# Patient Record
Sex: FEMALE | Race: WHITE | NOT HISPANIC OR LATINO | Employment: FULL TIME | URBAN - NONMETROPOLITAN AREA
[De-identification: names, ages, dates, MRNs, and addresses within clinical notes are randomized per-mention and may not be internally consistent; named-entity substitution may affect disease eponyms.]

---

## 2017-10-31 ENCOUNTER — NON-PROVIDER VISIT (OUTPATIENT)
Dept: URGENT CARE | Facility: PHYSICIAN GROUP | Age: 22
End: 2017-10-31

## 2017-10-31 DIAGNOSIS — Z02.1 DRUG SCREENING, PRE-EMPLOYMENT: ICD-10-CM

## 2017-10-31 PROCEDURE — 8907 PR URINE COLLECT ONLY: Performed by: PHYSICIAN ASSISTANT

## 2018-07-23 ENCOUNTER — OFFICE VISIT (OUTPATIENT)
Dept: URGENT CARE | Facility: PHYSICIAN GROUP | Age: 23
End: 2018-07-23
Payer: COMMERCIAL

## 2018-07-23 VITALS
RESPIRATION RATE: 16 BRPM | SYSTOLIC BLOOD PRESSURE: 110 MMHG | DIASTOLIC BLOOD PRESSURE: 86 MMHG | HEART RATE: 104 BPM | WEIGHT: 243 LBS | BODY MASS INDEX: 34.87 KG/M2 | TEMPERATURE: 98.1 F | OXYGEN SATURATION: 99 %

## 2018-07-23 DIAGNOSIS — J20.9 BRONCHOSPASM WITH BRONCHITIS, ACUTE: ICD-10-CM

## 2018-07-23 DIAGNOSIS — B96.89 ACUTE BACTERIAL RHINOSINUSITIS: ICD-10-CM

## 2018-07-23 DIAGNOSIS — J01.90 ACUTE BACTERIAL RHINOSINUSITIS: ICD-10-CM

## 2018-07-23 PROCEDURE — 99203 OFFICE O/P NEW LOW 30 MIN: CPT | Performed by: EMERGENCY MEDICINE

## 2018-07-23 RX ORDER — AMOXICILLIN AND CLAVULANATE POTASSIUM 875; 125 MG/1; MG/1
1 TABLET, FILM COATED ORAL 2 TIMES DAILY
Qty: 14 TAB | Refills: 0 | Status: SHIPPED | OUTPATIENT
Start: 2018-07-23 | End: 2022-02-09

## 2018-07-23 ASSESSMENT — ENCOUNTER SYMPTOMS
SINUS PRESSURE: 1
WHEEZING: 1
COUGH: 1
RHINORRHEA: 1
SPUTUM PRODUCTION: 0
HOARSE VOICE: 0
SHORTNESS OF BREATH: 0
VOMITING: 0
HEARTBURN: 0
DIARRHEA: 0
NAUSEA: 0
SORE THROAT: 1
FEVER: 0
HEMOPTYSIS: 0
SINUS PAIN: 1

## 2018-07-24 NOTE — PROGRESS NOTES
Subjective:      Vanda Villanueva is a 23 y.o. female who presents with Cough (x1wk) and Sore Throat (x4d)            Cough   This is a new problem. Episode onset: 2 weeks. The problem has been unchanged. The problem occurs every few minutes. The cough is non-productive. Associated symptoms include nasal congestion, postnasal drip, rhinorrhea, a sore throat and wheezing. Pertinent negatives include no chest pain, ear congestion, ear pain, fever, heartburn, hemoptysis, rash or shortness of breath. The symptoms are aggravated by lying down. She has tried OTC cough suppressant for the symptoms. The treatment provided mild relief. Her past medical history is significant for environmental allergies.   Sinusitis   This is a new problem. Episode onset: 2 weeks. The problem is unchanged. There has been no fever. The pain is mild. Associated symptoms include congestion, coughing, sinus pressure and a sore throat. Pertinent negatives include no ear pain, hoarse voice, shortness of breath or sneezing. Past treatments include oral decongestants. The treatment provided mild relief.   PMH RAD.    Review of Systems   Constitutional: Negative for fever.   HENT: Positive for congestion, postnasal drip, rhinorrhea, sinus pain, sinus pressure and sore throat. Negative for ear pain, hearing loss, hoarse voice, nosebleeds and sneezing.         Purulent nasal discharge noted.   Respiratory: Positive for cough and wheezing. Negative for hemoptysis, sputum production and shortness of breath.    Cardiovascular: Negative for chest pain.   Gastrointestinal: Negative for diarrhea, heartburn, nausea and vomiting.   Skin: Negative for rash.   Endo/Heme/Allergies: Positive for environmental allergies.     PMH:  has a past medical history of No active medical problems (5/18/2015).  MEDS:   Current Outpatient Prescriptions:   •  Albuterol Sulfate 108 (90 Base) MCG/ACT AEROSOL POWDER, BREATH ACTIVATED, Inhale 1-2 Puffs by mouth every 6 hours as needed  (coughing, wheezing)., Disp: 1 Each, Rfl: 0  •  amoxicillin-clavulanate (AUGMENTIN) 875-125 MG Tab, Take 1 Tab by mouth 2 times a day., Disp: 14 Tab, Rfl: 0  •  ciprofloxacin (CIPRO) 500 MG TABS, Take 1 Tab by mouth 2 times a day., Disp: 20 Tab, Rfl: 0  •  ondansetron (ZOFRAN ODT) 4 MG TBDP, Take 1 Tab by mouth every 8 hours as needed for Nausea/Vomiting., Disp: 10 Tab, Rfl: 0  ALLERGIES:   Allergies   Allergen Reactions   • Morphine      Ineffective for treatment     SURGHX:   Past Surgical History:   Procedure Laterality Date   • TIBIA ORIF      lt     SOCHX:  reports that she has never smoked. She has never used smokeless tobacco. She reports that she does not drink alcohol or use drugs.  FH: family history is not on file.       Objective:     /86   Pulse (!) 104   Temp 36.7 °C (98.1 °F)   Resp 16   Wt 110.2 kg (243 lb)   SpO2 99%   BMI 34.87 kg/m²      Physical Exam   Constitutional: She is oriented to person, place, and time. She appears well-developed and well-nourished. She is cooperative. She does not appear ill. No distress.   HENT:   Right Ear: Tympanic membrane and ear canal normal.   Left Ear: Tympanic membrane and ear canal normal.   Nose: Mucosal edema present. No rhinorrhea. Right sinus exhibits no maxillary sinus tenderness and no frontal sinus tenderness. Left sinus exhibits maxillary sinus tenderness and frontal sinus tenderness.   Mouth/Throat: Uvula is midline. No trismus in the jaw. No uvula swelling. No oropharyngeal exudate, posterior oropharyngeal edema or posterior oropharyngeal erythema.   Eyes: Conjunctivae are normal.   Neck: Trachea normal. Neck supple.   Cardiovascular: Normal rate, regular rhythm and normal heart sounds.    Pulmonary/Chest: Effort normal. She has no decreased breath sounds. She has no wheezes. She has no rhonchi. She has no rales.   Lymphadenopathy:     She has no cervical adenopathy.   Neurological: She is alert and oriented to person, place, and time.    Skin: Skin is warm and dry.   Psychiatric: She has a normal mood and affect.               Assessment/Plan:     1. Acute bacterial rhinosinusitis  Recommended supportive care measures, including rest, increasing oral fluid intake and use of over-the-counter medications for relief of symptoms.  Based on duration:  - amoxicillin-clavulanate (AUGMENTIN) 875-125 MG Tab; Take 1 Tab by mouth 2 times a day.  Dispense: 14 Tab; Refill: 0    2. Bronchospasm with bronchitis, acute  - Albuterol Sulfate 108 (90 Base) MCG/ACT AEROSOL POWDER, BREATH ACTIVATED; Inhale 1-2 Puffs by mouth every 6 hours as needed (coughing, wheezing).  Dispense: 1 Each; Refill: 0

## 2018-07-24 NOTE — PATIENT INSTRUCTIONS
Use over-the-counter oxymetazoline (Afrin) nasal spray as needed for up to 3 days only; follow package instructions for dosing.  Use over-the-counter acetaminophen (Tylenol) as needed for pain relief; follow the package instructions for dosing.  Consider use of over-the-counter dextromethorphan (Benylin DM) 10-30 mg every 4-8 hours as needed for cough relief.  Use an oral probiotic daily, such as Culturelle, Align, or yogurt to reduce gastrointestinal symptoms.  Bronchospasm, Adult  A bronchospasm is when the tubes that carry air in and out of your lungs (airways) spasm or tighten. During a bronchospasm it is hard to breathe. This is because the airways get smaller. A bronchospasm can be triggered by:  · Allergies. These may be to animals, pollen, food, or mold.  · Infection. This is a common cause of bronchospasm.  · Exercise.  · Irritants. These include pollution, cigarette smoke, strong odors, aerosol sprays, and paint fumes.  · Weather changes.  · Stress.  · Being emotional.  Follow these instructions at home:  · Always have a plan for getting help. Know when to call your doctor and local emergency services (911 in the [place] Know where you can get emergency care.  · Only take medicines as told by your doctor.  · If you were prescribed an inhaler or nebulizer machine, ask your doctor how to use it correctly. Always use a spacer with your inhaler if you were given one.  · Stay calm during an attack. Try to relax and breathe more slowly.  · Control your home environment:  ¨ Change your heating and air conditioning filter at least once a month.  ¨ Limit your use of fireplaces and wood stoves.  ¨ Do not  smoke. Do not  allow smoking in your home.  ¨ Avoid perfumes and fragrances.  ¨ Get rid of pests (such as roaches and mice) and their droppings.  ¨ Throw away plants if you see mold on them.  ¨ Keep your house clean and dust free.  ¨ Replace carpet with wood, tile, or vinyl april. Carpet can trap dander and  dust.  ¨ Use allergy-proof pillows, mattress covers, and box spring covers.  ¨ Wash bed sheets and blankets every week in hot water. Dry them in a dryer.  ¨ Use blankets that are made of polyester or cotton.  ¨ Wash hands frequently.  Contact a doctor if:  · You have muscle aches.  · You have chest pain.  · The thick spit you spit or cough up (sputum) changes from clear or white to yellow, green, gray, or bloody.  · The thick spit you spit or cough up gets thicker.  · There are problems that may be related to the medicine you are given such as:  ¨ A rash.  ¨ Itching.  ¨ Swelling.  ¨ Trouble breathing.  Get help right away if:  · You feel you cannot breathe or catch your breath.  · You cannot stop coughing.  · Your treatment is not helping you breathe better.  · You have very bad chest pain.  This information is not intended to replace advice given to you by your health care provider. Make sure you discuss any questions you have with your health care provider.  Document Released: 10/15/2010 Document Revised: 05/25/2017 Document Reviewed: 06/10/2014  [employer] Interactive Patient Education © 2017 [employer]    Sinusitis, Adult  Sinusitis is soreness and inflammation of your sinuses. Sinuses are hollow spaces in the bones around your face. Your sinuses are located:  · Around your eyes.  · In the middle of your forehead.  · Behind your nose.  · In your cheekbones.  Your sinuses and nasal passages are lined with a stringy fluid (mucus). Mucus normally drains out of your sinuses. When your nasal tissues become inflamed or swollen, the mucus can become trapped or blocked so air cannot flow through your sinuses. This allows bacteria, viruses, and funguses to grow, which leads to infection.  Sinusitis can develop quickly and last for 7?10 days (acute) or for more than 12 weeks (chronic). Sinusitis often develops after a cold.  What are the causes?  This condition is caused by anything that creates swelling in the sinuses  or stops mucus from draining, including:  · Allergies.  · Asthma.  · Bacterial or viral infection.  · Abnormally shaped bones between the nasal passages.  · Nasal growths that contain mucus (nasal polyps).  · Narrow sinus openings.  · Pollutants, such as chemicals or irritants in the air.  · A foreign object stuck in the nose.  · A fungal infection. This is rare.  What increases the risk?  The following factors may make you more likely to develop this condition:  · Having allergies or asthma.  · Having had a recent cold or respiratory tract infection.  · Having structural deformities or blockages in your nose or sinuses.  · Having a weak immune system.  · Doing a lot of swimming or diving.  · Overusing nasal sprays.  · Smoking.  What are the signs or symptoms?  The main symptoms of this condition are pain and a feeling of pressure around the affected sinuses. Other symptoms include:  · Upper toothache.  · Earache.  · Headache.  · Bad breath.  · Decreased sense of smell and taste.  · A cough that may get worse at night.  · Fatigue.  · Fever.  · Thick drainage from your nose. The drainage is often green and it may contain pus (purulent).  · Stuffy nose or congestion.  · Postnasal drip. This is when extra mucus collects in the throat or back of the nose.  · Swelling and warmth over the affected sinuses.  · Sore throat.  · Sensitivity to light.  How is this diagnosed?  This condition is diagnosed based on symptoms, a medical history, and a physical exam. To find out if your condition is acute or chronic, your health care provider may:  · Look in your nose for signs of nasal polyps.  · Tap over the affected sinus to check for signs of infection.  · View the inside of your sinuses using an imaging device that has a light attached (endoscope).  If your health care provider suspects that you have chronic sinusitis, you may also:  · Be tested for allergies.  · Have a sample of mucus taken from your nose (nasal culture) and  checked for bacteria.  · Have a mucus sample examined to see if your sinusitis is related to an allergy.  If your sinusitis does not respond to treatment and it lasts longer than 8 weeks, you may have an MRI or CT scan to check your sinuses. These scans also help to determine how severe your infection is.  In rare cases, a bone biopsy may be done to rule out more serious types of fungal sinus disease.  How is this treated?  Treatment for sinusitis depends on the cause and whether your condition is chronic or acute. If a virus is causing your sinusitis, your symptoms will go away on their own within 10 days. You may be given medicines to relieve your symptoms, including:  · Topical nasal decongestants. They shrink swollen nasal passages and let mucus drain from your sinuses.  · Antihistamines. These drugs block inflammation that is triggered by allergies. This can help to ease swelling in your nose and sinuses.  · Topical nasal corticosteroids. These are nasal sprays that ease inflammation and swelling in your nose and sinuses.  · Nasal saline washes. These rinses can help to get rid of thick mucus in your nose.  If your condition is caused by bacteria, you will be given an antibiotic medicine. If your condition is caused by a fungus, you will be given an antifungal medicine.  Surgery may be needed to correct underlying conditions, such as narrow nasal passages. Surgery may also be needed to remove polyps.  Follow these instructions at home:  Medicines  · Take, use, or apply over-the-counter and prescription medicines only as told by your health care provider. These may include nasal sprays.  · If you were prescribed an antibiotic medicine, take it as told by your health care provider. Do not stop taking the antibiotic even if you start to feel better.  Hydrate and Humidify  · Drink enough water to keep your urine clear or pale yellow. Staying hydrated will help to thin your mucus.  · Use a cool mist humidifier to  keep the humidity level in your home above 50%.  · Inhale steam for 10-15 minutes, 3-4 times a day or as told by your health care provider. You can do this in the bathroom while a hot shower is running.  · Limit your exposure to cool or dry air.  Rest  · Rest as much as possible.  · Sleep with your head raised (elevated).  · Make sure to get enough sleep each night.  General instructions  · Apply a warm, moist washcloth to your face 3-4 times a day or as told by your health care provider. This will help with discomfort.  · Wash your hands often with soap and water to reduce your exposure to viruses and other germs. If soap and water are not available, use hand .  · Do not smoke. Avoid being around people who are smoking (secondhand smoke).  · Keep all follow-up visits as told by your health care provider. This is important.  Contact a health care provider if:  · You have a fever.  · Your symptoms get worse.  · Your symptoms do not improve within 10 days.  Get help right away if:  · You have a severe headache.  · You have persistent vomiting.  · You have pain or swelling around your face or eyes.  · You have vision problems.  · You develop confusion.  · Your neck is stiff.  · You have trouble breathing.  This information is not intended to replace advice given to you by your health care provider. Make sure you discuss any questions you have with your health care provider.  Document Released: 12/18/2006 Document Revised: 08/13/2017 Document Reviewed: 10/12/2016  [employer] Interactive Patient Education © 2017 [employer]

## 2021-05-21 ENCOUNTER — HOSPITAL ENCOUNTER (OUTPATIENT)
Dept: LAB | Facility: MEDICAL CENTER | Age: 26
End: 2021-05-21
Attending: OBSTETRICS & GYNECOLOGY
Payer: COMMERCIAL

## 2021-05-21 LAB
PROLACTIN SERPL-MCNC: 12 NG/ML (ref 2.8–26)
TSH SERPL DL<=0.005 MIU/L-ACNC: 1.25 UIU/ML (ref 0.38–5.33)

## 2021-05-21 PROCEDURE — 84443 ASSAY THYROID STIM HORMONE: CPT

## 2021-05-21 PROCEDURE — 82951 GLUCOSE TOLERANCE TEST (GTT): CPT

## 2021-05-21 PROCEDURE — 82952 GTT-ADDED SAMPLES: CPT

## 2021-05-21 PROCEDURE — 83520 IMMUNOASSAY QUANT NOS NONAB: CPT

## 2021-05-21 PROCEDURE — 83525 ASSAY OF INSULIN: CPT | Mod: 91

## 2021-05-21 PROCEDURE — 84146 ASSAY OF PROLACTIN: CPT

## 2021-05-21 PROCEDURE — 36415 COLL VENOUS BLD VENIPUNCTURE: CPT

## 2021-05-24 LAB
GLUCOSE 1H P CHAL SERPL-MCNC: 120 MG/DL (ref 65–199)
GLUCOSE 2H P CHAL SERPL-MCNC: 98 MG/DL (ref 65–139)
GLUCOSE BS SERPL-MCNC: 93 MG/DL (ref 65–99)
INSULIN 1H P CHAL SERPL-ACNC: 147 UIU/ML (ref 29–88)
INSULIN 2H P CHAL SERPL-ACNC: 98 UIU/ML (ref 22–79)
INSULIN P FAST SERPL-ACNC: 19 UIU/ML (ref 3–19)

## 2021-05-25 LAB — MIS SERPL-MCNC: 5.82 NG/ML (ref 0.4–16.02)

## 2022-02-09 ENCOUNTER — HOSPITAL ENCOUNTER (OUTPATIENT)
Facility: MEDICAL CENTER | Age: 27
End: 2022-02-09
Attending: NURSE PRACTITIONER
Payer: COMMERCIAL

## 2022-02-09 ENCOUNTER — OFFICE VISIT (OUTPATIENT)
Dept: URGENT CARE | Facility: PHYSICIAN GROUP | Age: 27
End: 2022-02-09
Payer: COMMERCIAL

## 2022-02-09 VITALS
DIASTOLIC BLOOD PRESSURE: 70 MMHG | HEIGHT: 70 IN | OXYGEN SATURATION: 97 % | WEIGHT: 250 LBS | RESPIRATION RATE: 20 BRPM | SYSTOLIC BLOOD PRESSURE: 124 MMHG | HEART RATE: 87 BPM | BODY MASS INDEX: 35.79 KG/M2 | TEMPERATURE: 98 F

## 2022-02-09 DIAGNOSIS — J01.00 ACUTE NON-RECURRENT MAXILLARY SINUSITIS: ICD-10-CM

## 2022-02-09 DIAGNOSIS — U07.1 COVID-19: ICD-10-CM

## 2022-02-09 PROCEDURE — U0003 INFECTIOUS AGENT DETECTION BY NUCLEIC ACID (DNA OR RNA); SEVERE ACUTE RESPIRATORY SYNDROME CORONAVIRUS 2 (SARS-COV-2) (CORONAVIRUS DISEASE [COVID-19]), AMPLIFIED PROBE TECHNIQUE, MAKING USE OF HIGH THROUGHPUT TECHNOLOGIES AS DESCRIBED BY CMS-2020-01-R: HCPCS

## 2022-02-09 PROCEDURE — 99203 OFFICE O/P NEW LOW 30 MIN: CPT | Mod: CS | Performed by: NURSE PRACTITIONER

## 2022-02-09 PROCEDURE — U0005 INFEC AGEN DETEC AMPLI PROBE: HCPCS

## 2022-02-09 RX ORDER — AMOXICILLIN 875 MG/1
875 TABLET, COATED ORAL 2 TIMES DAILY
Qty: 14 TABLET | Refills: 0 | Status: SHIPPED | OUTPATIENT
Start: 2022-02-09 | End: 2022-02-16

## 2022-02-09 RX ORDER — ESCITALOPRAM OXALATE 10 MG/1
10 TABLET ORAL DAILY
COMMUNITY
End: 2023-09-02

## 2022-02-09 ASSESSMENT — ENCOUNTER SYMPTOMS
DIARRHEA: 0
SINUS PRESSURE: 1
FEVER: 0
SORE THROAT: 1
SHORTNESS OF BREATH: 0
NAUSEA: 1
WHEEZING: 0
VOMITING: 0
SINUS PAIN: 1
COUGH: 0

## 2022-02-09 NOTE — PROGRESS NOTES
Subjective:     Vanda Parmar is a 26 y.o. female who presents for Other (sinus congestion x 1 week )      Started last Wednesday. Post nasal drip.  Ear and dental pain last week. Had 2 negative home COVID tests. Possible COVID in Feb 2020. Vaccinated. Sudafed, humidifier, Vicks. Is 11 weeks pregnant. Denies vaginal bleeding or abnormal abdominal pain. Has had intermittent cramping throughout pregnancy, OB/GYn advised her that is normal.     Sinus Problem  This is a new problem. The current episode started 1 to 4 weeks ago. The problem has been gradually worsening since onset. There has been no fever. Associated symptoms include congestion, ear pain, sinus pressure and a sore throat. Pertinent negatives include no coughing or shortness of breath. The treatment provided mild relief.       Past Medical History:   Diagnosis Date   • No active medical problems 5/18/2015       Past Surgical History:   Procedure Laterality Date   • ORIF, FRACTURE, TIBIA      lt       Social History     Socioeconomic History   • Marital status:      Spouse name: Not on file   • Number of children: Not on file   • Years of education: Not on file   • Highest education level: Not on file   Occupational History   • Not on file   Tobacco Use   • Smoking status: Never Smoker   • Smokeless tobacco: Never Used   Substance and Sexual Activity   • Alcohol use: No   • Drug use: No   • Sexual activity: Never   Other Topics Concern   • Not on file   Social History Narrative   • Not on file     Social Determinants of Health     Financial Resource Strain:    • Difficulty of Paying Living Expenses: Not on file   Food Insecurity:    • Worried About Running Out of Food in the Last Year: Not on file   • Ran Out of Food in the Last Year: Not on file   Transportation Needs:    • Lack of Transportation (Medical): Not on file   • Lack of Transportation (Non-Medical): Not on file   Physical Activity:    • Days of Exercise per Week: Not on file   •  "Minutes of Exercise per Session: Not on file   Stress:    • Feeling of Stress : Not on file   Social Connections:    • Frequency of Communication with Friends and Family: Not on file   • Frequency of Social Gatherings with Friends and Family: Not on file   • Attends Alevism Services: Not on file   • Active Member of Clubs or Organizations: Not on file   • Attends Club or Organization Meetings: Not on file   • Marital Status: Not on file   Intimate Partner Violence:    • Fear of Current or Ex-Partner: Not on file   • Emotionally Abused: Not on file   • Physically Abused: Not on file   • Sexually Abused: Not on file   Housing Stability:    • Unable to Pay for Housing in the Last Year: Not on file   • Number of Places Lived in the Last Year: Not on file   • Unstable Housing in the Last Year: Not on file        No family history on file.     Allergies   Allergen Reactions   • Morphine      Ineffective for treatment       Review of Systems   Constitutional: Positive for malaise/fatigue. Negative for fever.   HENT: Positive for congestion, ear pain, sinus pressure, sinus pain and sore throat. Negative for ear discharge.    Respiratory: Negative for cough, shortness of breath and wheezing.    Gastrointestinal: Positive for nausea. Negative for diarrhea and vomiting.   All other systems reviewed and are negative.       Objective:   /70   Pulse 87   Temp 36.7 °C (98 °F) (Temporal)   Resp 20   Ht 1.778 m (5' 10\")   Wt 113 kg (250 lb)   SpO2 97%   BMI 35.87 kg/m²     Physical Exam  Vitals reviewed.   Constitutional:       General: She is not in acute distress.     Appearance: She is well-developed. She is ill-appearing.   HENT:      Head: Normocephalic and atraumatic.      Right Ear: Ear canal and external ear normal. A middle ear effusion is present. Tympanic membrane is not perforated or erythematous.      Left Ear: Ear canal and external ear normal. A middle ear effusion is present. Tympanic membrane is not " perforated or erythematous.      Ears:      Comments: Clear effusions.      Nose: Congestion present.      Right Sinus: Maxillary sinus tenderness present.      Left Sinus: Maxillary sinus tenderness present.      Mouth/Throat:      Lips: Pink.      Mouth: Mucous membranes are moist.      Pharynx: No pharyngeal swelling or posterior oropharyngeal erythema.      Comments: Clear post nasal drip.   Eyes:      Conjunctiva/sclera: Conjunctivae normal.   Cardiovascular:      Rate and Rhythm: Normal rate.   Pulmonary:      Effort: Pulmonary effort is normal. No accessory muscle usage, prolonged expiration, respiratory distress or retractions.      Breath sounds: Normal breath sounds. No stridor. No wheezing, rhonchi or rales.   Musculoskeletal:         General: Normal range of motion.      Cervical back: Normal range of motion and neck supple.   Skin:     General: Skin is warm and dry.      Findings: No rash.   Neurological:      General: No focal deficit present.      Mental Status: She is alert and oriented to person, place, and time.      GCS: GCS eye subscore is 4. GCS verbal subscore is 5. GCS motor subscore is 6.   Psychiatric:         Mood and Affect: Mood normal.         Speech: Speech normal.         Behavior: Behavior normal.         Thought Content: Thought content normal.         Judgment: Judgment normal.         Assessment/Plan:   1. Acute non-recurrent maxillary sinusitis  - SARS-CoV-2 PCR (24 hour In-House): Collect NP swab in [facility] Future  - amoxicillin (AMOXIL) 875 MG tablet; Take 1 Tablet by mouth 2 times a day for 7 days.  Dispense: 14 Tablet; Refill: 0    Symptomatic care:   -Hydration  -Adequate rest  -Frequent ambulation with more advanced activity as soon as tolerated  -Tylenol for pain and fever as directed.  -You may try saline irrigation or neti pot.   -Warm compress to sinuses.     Follow up for persistent or worsening symptoms, facial swelling, stiff neck, or any other concerns. Follow up  emergently with any complications or concerns: shortness of breath, abdominal pain, persistent fever unrelieved with tylenol, dehydration on inability to tolerate fluids, persistent pleuritic chest pain, tachycardia (fast heart rate), dizziness, confusion or obstetric complications (eg,  contractions, vaginal bleeding, rupture of membranes).     Discussed viral presentation. S&S of COVID, isolation guidelines. Discussed possible higher risk for complications during pregnancy, including pneumonia. Stable vitals. Non-labored breathing. Contingent antibiotic for sinusitis symptoms greater than 10 days, hx of bacterial sinusitis. If positive for COVID, no indication to start the antibiotic at this time.     Differential diagnosis, natural history, supportive care, and indications for immediate follow-up discussed.

## 2022-02-09 NOTE — PATIENT INSTRUCTIONS
Symptomatic care:   -Hydration  -Adequate rest  -Frequent ambulation with more advanced activity as soon as tolerated  -Tylenol for pain and fever as directed.  -You may try saline irrigation or neti pot.   -Warm compress to sinuses.     Follow up for persistent or worsening symptoms, facial swelling, stiff neck, or any other concerns. Follow up emergently with any complications or concerns: shortness of breath, abdominal pain, persistent fever unrelieved with tylenol, dehydration on inability to tolerate fluids, persistent pleuritic chest pain, tachycardia (fast heart rate), dizziness, confusion or obstetric complications (eg,  contractions, vaginal bleeding, rupture of membranes).     Sinusitis, Adult  Sinusitis is inflammation of your sinuses. Sinuses are hollow spaces in the bones around your face. Your sinuses are located:  · Around your eyes.  · In the middle of your forehead.  · Behind your nose.  · In your cheekbones.  Mucus normally drains out of your sinuses. When your nasal tissues become inflamed or swollen, mucus can become trapped or blocked. This allows bacteria, viruses, and fungi to grow, which leads to infection. Most infections of the sinuses are caused by a virus.  Sinusitis can develop quickly. It can last for up to 4 weeks (acute) or for more than 12 weeks (chronic). Sinusitis often develops after a cold.  What are the causes?  This condition is caused by anything that creates swelling in the sinuses or stops mucus from draining. This includes:  · Allergies.  · Asthma.  · Infection from bacteria or viruses.  · Deformities or blockages in your nose or sinuses.  · Abnormal growths in the nose (nasal polyps).  · Pollutants, such as chemicals or irritants in the air.  · Infection from fungi (rare).  What increases the risk?  You are more likely to develop this condition if you:  · Have a weak body defense system (immune system).  · Do a lot of swimming or diving.  · Overuse nasal  sprays.  · Smoke.  What are the signs or symptoms?  The main symptoms of this condition are pain and a feeling of pressure around the affected sinuses. Other symptoms include:  · Stuffy nose or congestion.  · Thick drainage from your nose.  · Swelling and warmth over the affected sinuses.  · Headache.  · Upper toothache.  · A cough that may get worse at night.  · Extra mucus that collects in the throat or the back of the nose (postnasal drip).  · Decreased sense of smell and taste.  · Fatigue.  · A fever.  · Sore throat.  · Bad breath.  How is this diagnosed?  This condition is diagnosed based on:  · Your symptoms.  · Your medical history.  · A physical exam.  · Tests to find out if your condition is acute or chronic. This may include:  ? Checking your nose for nasal polyps.  ? Viewing your sinuses using a device that has a light (endoscope).  ? Testing for allergies or bacteria.  ? Imaging tests, such as an MRI or CT scan.  In rare cases, a bone biopsy may be done to rule out more serious types of fungal sinus disease.  How is this treated?  Treatment for sinusitis depends on the cause and whether your condition is chronic or acute.  · If caused by a virus, your symptoms should go away on their own within 10 days. You may be given medicines to relieve symptoms. They include:  ? Medicines that shrink swollen nasal passages (topical intranasal decongestants).  ? Medicines that treat allergies (antihistamines).  ? A spray that eases inflammation of the nostrils (topical intranasal corticosteroids).  ? Rinses that help get rid of thick mucus in your nose (nasal saline washes).  · If caused by bacteria, your health care provider may recommend waiting to see if your symptoms improve. Most bacterial infections will get better without antibiotic medicine. You may be given antibiotics if you have:  ? A severe infection.  ? A weak immune system.  · If caused by narrow nasal passages or nasal polyps, you may need to have  surgery.  Follow these instructions at home:  Medicines  · Take, use, or apply over-the-counter and prescription medicines only as told by your health care provider. These may include nasal sprays.  · If you were prescribed an antibiotic medicine, take it as told by your health care provider. Do not stop taking the antibiotic even if you start to feel better.  Hydrate and humidify    · Drink enough fluid to keep your urine pale yellow. Staying hydrated will help to thin your mucus.  · Use a cool mist humidifier to keep the humidity level in your home above 50%.  · Inhale steam for 10-15 minutes, 3-4 times a day, or as told by your health care provider. You can do this in the bathroom while a hot shower is running.  · Limit your exposure to cool or dry air.  Rest  · Rest as much as possible.  · Sleep with your head raised (elevated).  · Make sure you get enough sleep each night.  General instructions    · Apply a warm, moist washcloth to your face 3-4 times a day or as told by your health care provider. This will help with discomfort.  · Wash your hands often with soap and water to reduce your exposure to germs. If soap and water are not available, use hand .  · Do not smoke. Avoid being around people who are smoking (secondhand smoke).  · Keep all follow-up visits as told by your health care provider. This is important.  Contact a health care provider if:  · You have a fever.  · Your symptoms get worse.  · Your symptoms do not improve within 10 days.  Get help right away if:  · You have a severe headache.  · You have persistent vomiting.  · You have severe pain or swelling around your face or eyes.  · You have vision problems.  · You develop confusion.  · Your neck is stiff.  · You have trouble breathing.  Summary  · Sinusitis is soreness and inflammation of your sinuses. Sinuses are hollow spaces in the bones around your face.  · This condition is caused by nasal tissues that become inflamed or swollen.  The swelling traps or blocks the flow of mucus. This allows bacteria, viruses, and fungi to grow, which leads to infection.  · If you were prescribed an antibiotic medicine, take it as told by your health care provider. Do not stop taking the antibiotic even if you start to feel better.  · Keep all follow-up visits as told by your health care provider. This is important.  This information is not intended to replace advice given to you by your health care provider. Make sure you discuss any questions you have with your health care provider.  Document Released: 12/18/2006 Document Revised: 05/20/2019 Document Reviewed: 05/20/2019  [employer] Patient Education © 2020 [employer]      Pregnancy and COVID-19  Coronavirus disease, also called COVID-19, is an infection of the lungs and airways (respiratory tract). It is unclear at this time if pregnancy makes it more likely for you to get COVID-19, or what effects the infection may have on your unborn baby. However, pregnancy causes changes to your heart, lungs, and the body's disease-fighting system (immune system). Some of these changes make it more likely for you to get sick and have more serious illness. Therefore, it is important for you to take precautions in order to protect yourself and your unborn baby.  Work with your health care team to develop a plan to protect yourself from all infections, including COVID-19. This is one way for you to stay healthy during your pregnancy and to keep your baby healthy as well.  How does this affect me?  If you get COVID-19, there is a risk that you may:  · Get a respiratory illness that can lead to pneumonia.  · Give birth to your baby before 37 weeks of pregnancy (premature birth).  If you have or may have COVID-19, your health care provider may recommend special precautions around your pregnancy. This may affect how you:  · Receive care before delivery (prenatal care). How you visit your health care provider may change. Tests  and scans may need to be performed differently.  · Receive care during labor and delivery. This may affect your birth plan, including who may be with you during labor and delivery.  · Receive care after you deliver your baby (postpartum care). You may stay longer in the hospital, and in a special room. Your baby may also need to stay away from you.  · Feed your baby after he or she is born.  Pregnancy can be an especially stressful time because of the changes in your body and the preparation involved in becoming a parent. In addition, you may be feeling especially fearful, anxious, or stressed because of COVID-19 and how it is affecting you.  How does this affect my baby?  It is not known whether a mother will transmit the virus to her unborn baby. There is a risk that if you get COVID-19:  · The virus that causes COVID-19 can pass to your baby.  · You may have premature birth. Your baby may require more medical care if this happens.  What can I do to lower my risk?    There is no vaccine to help prevent COVID-19. However, there are actions that you can take to protect yourself and others from this virus.  Cleaning and personal hygiene  · Wash your hands often with soap and water for at least 20 seconds. If soap and water are not available, use alcohol-based hand .  · Avoid touching your mouth, face, eyes, or nose.  · Clean and disinfect objects and surfaces that are frequently touched every day. This may include:  ? Counters and tables.  ? Doorknobs and light switches.  ? Sinks and faucets.  ? Electronics such as phones, remote controls, keyboards, computers, and tablets.  Stay away from others  · Stay away from people who are sick, if possible.  · Avoid social gatherings and travel.  · Stay home as much as possible.  Follow these instructions:  Breastfeeding  It is not known if the virus that causes COVID-19 can pass through breast milk to your baby. You should make a plan for feeding your infant with  your family and your health care team.  If you have or may have COVID-19, your health care provider may recommend that you take precautions while breastfeeding, such as:  · Washing your hands before feeding your baby.  · Wearing a mask while feeding your baby.  · Pumping or expressing breast milk to feed to your baby. If possible, ask someone in your household who is not sick to feed your baby the expressed breast milk.  ? Wash your hands before touching pump parts.  ? Wash and disinfect all pump parts after expressing milk. Follow the 's instructions to clean and disinfect all pump parts.  General instructions  · If you think you have a COVID-19 infection, contact your health care provider right away. Tell your health care provider that you think you may have a COVID-19 infection.  · Follow your health care provider's instructions on taking medicines. Some medicines may be unsafe to take during pregnancy.  · Cover your mouth and nose by wearing a mask or other cloth covering over your face when you go out in public.  · Find ways to manage stress. These may include:  ? Using relaxation techniques like meditation and deep breathing.  ? Getting regular exercise. Most women can continue their usual exercise routine during pregnancy. Ask your health care provider what activities are safe for you.  ? Seeking support from family, friends, or spiritual resources. If you cannot be together in person, you can still connect by phone calls, texts, video calls, or online messaging.  ? Spending time doing relaxing activities that you enjoy, like listening to music or reading a good book.  · Ask for help if you have counseling or nutritional needs during pregnancy. Your health care provider can offer advice or refer you to resources or specialists who can help you with various needs.  · Keep all follow-up visits as told by your health care provider. This is important.  Where to find more information  Centers for  Disease Control and Prevention (CDC): www.cdc.gov/coronavirus/2019-ncov/  World Health Organization (WHO): www.who.int/news-room/q-a-detail/b-h-nh-covid-19-pregnancy-childbirth-and-breastfeeding  American College of Obstetricians and Gynecologists (ACOG): www.acog.org/patient-resources/faqs/pregnancy/coronavirus-pregnancy-and-breastfeeding  Questions to ask your health care team  · What should I do if I have COVID-19 symptoms?  · How will COVID-19 affect my prenatal care visits, tests and scans, labor and delivery, and postpartum care?  · Should I plan to breastfeed my baby?  · Where can I find mental health resources?  · Where can I find support if I have financial concerns?  Contact a health care provider if:  · You have signs and symptoms of infection, including a fever or cough. Tell your health care team that you think you may have a COVID-19 infection.  · You have strong emotions, such as sadness or anxiety.  · You feel unsafe in your home and need help finding a safe place to live.  · You have bloody or watery vaginal discharge or vaginal bleeding.  Get help right away if:  · You have signs or symptoms of labor before 37 weeks of pregnancy. These include:  ? Contractions that are 5 minutes or less apart, or that increase in frequency, intensity, or length.  ? Sudden, sharp pain in the abdomen or in the lower back.  ? A gush or trickle of fluid from your vagina.  · You have difficulty breathing.  · You have chest pain.  These symptoms may represent a serious problem that is an emergency. Do not wait to see if the symptoms will go away. Get medical help right away. Call your local emergency services (911 in the [place] Do not drive yourself to the hospital.  Summary  · Coronavirus disease, also called COVID-19, is an infection of the lungs and airways (respiratory tract). It is unclear at this time if pregnancy makes you more susceptible to COVID-19 and what effects it may have on unborn babies.  · It is  important to take precautions to protect yourself and your developing baby. This includes washing your hands often, avoiding touching your mouth, face, eyes, or nose, avoiding social gatherings and travel, and staying away from people who are sick.  · If you think you have a COVID-19 infection, contact your health care provider right away. Tell your health care provider that you think you may have a COVID-19 infection.  · If you have or may have COVID-19, your health care provider may recommend special precautions during your pregnancy, labor and delivery, and after your baby is born.  This information is not intended to replace advice given to you by your health care provider. Make sure you discuss any questions you have with your health care provider.  Document Released: 04/14/2020 Document Revised: 04/14/2020 Document Reviewed: 04/14/2020  [employer] Patient Education © 2020 [employer]

## 2022-02-10 LAB
SARS-COV-2 RNA RESP QL NAA+PROBE: DETECTED
SPECIMEN SOURCE: ABNORMAL

## 2023-04-13 ENCOUNTER — APPOINTMENT (OUTPATIENT)
Dept: RADIOLOGY | Facility: IMAGING CENTER | Age: 28
End: 2023-04-13
Payer: COMMERCIAL

## 2023-04-13 ENCOUNTER — OFFICE VISIT (OUTPATIENT)
Dept: URGENT CARE | Facility: PHYSICIAN GROUP | Age: 28
End: 2023-04-13
Payer: COMMERCIAL

## 2023-04-13 VITALS
WEIGHT: 254 LBS | BODY MASS INDEX: 36.36 KG/M2 | SYSTOLIC BLOOD PRESSURE: 120 MMHG | TEMPERATURE: 96.9 F | OXYGEN SATURATION: 99 % | DIASTOLIC BLOOD PRESSURE: 64 MMHG | RESPIRATION RATE: 14 BRPM | HEIGHT: 70 IN | HEART RATE: 83 BPM

## 2023-04-13 DIAGNOSIS — T50.905A PILL ESOPHAGITIS: ICD-10-CM

## 2023-04-13 DIAGNOSIS — K20.80 PILL ESOPHAGITIS: ICD-10-CM

## 2023-04-13 DIAGNOSIS — R07.89 CHEST PRESSURE: ICD-10-CM

## 2023-04-13 PROCEDURE — 71046 X-RAY EXAM CHEST 2 VIEWS: CPT | Mod: TC,FY | Performed by: RADIOLOGY

## 2023-04-13 PROCEDURE — 93000 ELECTROCARDIOGRAM COMPLETE: CPT

## 2023-04-13 PROCEDURE — 99214 OFFICE O/P EST MOD 30 MIN: CPT

## 2023-04-13 RX ORDER — HYDROXYZINE HYDROCHLORIDE 10 MG/1
TABLET, FILM COATED ORAL
COMMUNITY
Start: 2023-03-21

## 2023-04-13 RX ORDER — SUCRALFATE ORAL 1 G/10ML
1 SUSPENSION ORAL 4 TIMES DAILY
Qty: 414 ML | Refills: 3 | Status: SHIPPED | OUTPATIENT
Start: 2023-04-13 | End: 2023-04-16

## 2023-04-13 RX ORDER — DOXYCYCLINE HYCLATE 100 MG/1
CAPSULE ORAL
COMMUNITY
Start: 2023-03-20 | End: 2023-09-02

## 2023-04-13 RX ORDER — PYRIDOXINE HCL (VITAMIN B6) 50 MG
50 TABLET ORAL DAILY
COMMUNITY

## 2023-04-13 RX ORDER — FAMOTIDINE 20 MG/1
20 TABLET, FILM COATED ORAL 2 TIMES DAILY
Qty: 28 TABLET | Refills: 0 | Status: SHIPPED | OUTPATIENT
Start: 2023-04-13 | End: 2023-04-27

## 2023-04-13 ASSESSMENT — ENCOUNTER SYMPTOMS
BLOOD IN STOOL: 0
ABDOMINAL PAIN: 0
VOMITING: 0
NAUSEA: 0
HEARTBURN: 1
CONSTIPATION: 0
FEVER: 0
DIARRHEA: 0

## 2023-04-13 ASSESSMENT — FIBROSIS 4 INDEX: FIB4 SCORE: .5574416392175697037

## 2023-04-13 NOTE — PROGRESS NOTES
"Subjective:     Vanda Parmar is a 27 y.o. female who presents for Food Stuck In Throat (Took a pill 2 days ago now having pressure in her esophagus. Not comfortable to sit unless slouching.  )    The symptoms began two weeks ago. The patient endorses that she woke up and felt like she was choking and felt like something was \"stuck in her throat.\" Since this incident, the pressure has increased and feels like a \"squeezing\" around her ribcage. No fevers. She has tried water, coconut water, hot tea, carbonated drinks, and bread. She endorses mild SOB today. No associated symptoms. She reports that her PMH is significant for GERD, pre-eclampsia 7 months ago and abdominal pain 8 years prior, which resolved with a juice cleanse and food elimination. Eating is painful at this time.    Review of Systems   Constitutional:  Negative for fever.   Cardiovascular:  Positive for chest pain.   Gastrointestinal:  Positive for heartburn. Negative for abdominal pain, blood in stool, constipation, diarrhea, melena, nausea and vomiting.   All other systems reviewed and are negative.    PMH:   Past Medical History:   Diagnosis Date    No active medical problems 5/18/2015     ALLERGIES:   Allergies   Allergen Reactions    Morphine      Ineffective for treatment     SURGHX:   Past Surgical History:   Procedure Laterality Date    ORIF, FRACTURE, TIBIA      lt     SOCHX:   Social History     Socioeconomic History    Marital status:    Tobacco Use    Smoking status: Never    Smokeless tobacco: Never   Substance and Sexual Activity    Alcohol use: No    Drug use: No    Sexual activity: Never     FH: No family history on file.      Objective:   /64   Pulse 83   Temp 36.1 °C (96.9 °F) (Temporal)   Resp 14   Ht 1.778 m (5' 10\")   Wt 115 kg (254 lb)   SpO2 99%   BMI 36.45 kg/m²     Physical Exam  Vitals reviewed.   Constitutional:       General: She is not in acute distress.     Appearance: Normal appearance. She is not " ill-appearing, toxic-appearing or diaphoretic.   HENT:      Head: Normocephalic and atraumatic.      Right Ear: Tympanic membrane, ear canal and external ear normal.      Left Ear: Tympanic membrane, ear canal and external ear normal.      Nose: Nose normal.      Mouth/Throat:      Mouth: Mucous membranes are moist.   Eyes:      Extraocular Movements: Extraocular movements intact.      Conjunctiva/sclera: Conjunctivae normal.      Pupils: Pupils are equal, round, and reactive to light.   Cardiovascular:      Rate and Rhythm: Normal rate and regular rhythm.      Pulses: Normal pulses.      Heart sounds: Normal heart sounds. Heart sounds not distant. No murmur heard.    No S3 or S4 sounds.   Pulmonary:      Effort: Pulmonary effort is normal.   Abdominal:      Palpations: Abdomen is soft.      Tenderness: There is no abdominal tenderness. There is no right CVA tenderness, left CVA tenderness, guarding or rebound. Negative signs include Estevez's sign and Rovsing's sign.   Musculoskeletal:         General: Normal range of motion.      Cervical back: Normal range of motion.      Right lower leg: No edema.      Left lower leg: No edema.   Skin:     General: Skin is warm and dry.   Neurological:      Mental Status: She is alert.   Psychiatric:         Mood and Affect: Mood normal.         Behavior: Behavior normal.         Thought Content: Thought content normal.     Two views of the chest.     COMPARISON:  None available.     FINDINGS:  Cardiomediastinal contour is within normal limits.  No focal pulmonary consolidation.  No pleural fluid collection or pneumothorax.  No major bony abnormality is seen.     IMPRESSION:     No acute cardiopulmonary disease.    EKG: NSR normal axis, normal intervals, no evidence of ischemia or hypertrophy. See MEDIA scan.      Assessment/Plan:   Assessment      1. Chest pressure  - DX-CHEST-2 VIEWS  - EKG - Clinic Performed    2. Pill esophagitis  - sucralfate (CARAFATE) 1 GM/10ML Suspension;  Take 10 mL by mouth 4 times a day for 3 days.  Dispense: 414 mL; Refill: 3  - famotidine (PEPCID) 20 MG Tab; Take 1 Tablet by mouth 2 times a day for 14 days.  Dispense: 28 Tablet; Refill: 0    Negative chest x-ray.  Normal EKG in clinic.  Based on patient's presenting symptoms, patient likely suffering from pill esophagitis due to doxycycline or vitamin C tablet with which she takes at night before she goes to bed.  Prescription for sucralfate and famotidine sent to patient's preferred pharmacy.  Patient verbalized that she will stop the doxycycline tonight and not take her next dose until after meeting with her dermatologist this coming Monday.  Educated patient about symptom management for acute esophagitis. All questions answered. Patient verbalized understanding and is in agreement with this plan of care.     Differential diagnosis, natural history, and supportive care discussed. AVS handout given and reviewed with patient. Patient educated on red flags and when to seek treatment back in ED or UC.     I personally reviewed prior external notes and test results pertinent to today's visit.  I have independently reviewed and interpreted all diagnostics ordered during this urgent care visit.     This dictation has been created using voice recognition software. The accuracy of the dictation is limited by the abilities of the software. I expect there may be some errors of grammar and possibly content. I made every attempt to manually correct the errors within my dictation. However, errors related to voice recognition software may still exist and should be interpreted within the appropriate context.    This note was electronically signed by ALTAGRACIA Rice

## 2023-09-02 ENCOUNTER — OFFICE VISIT (OUTPATIENT)
Dept: URGENT CARE | Facility: PHYSICIAN GROUP | Age: 28
End: 2023-09-02
Payer: COMMERCIAL

## 2023-09-02 VITALS
TEMPERATURE: 97.5 F | OXYGEN SATURATION: 97 % | BODY MASS INDEX: 35.5 KG/M2 | RESPIRATION RATE: 18 BRPM | SYSTOLIC BLOOD PRESSURE: 110 MMHG | DIASTOLIC BLOOD PRESSURE: 70 MMHG | HEIGHT: 70 IN | HEART RATE: 94 BPM | WEIGHT: 248 LBS

## 2023-09-02 DIAGNOSIS — K82.9 GALLBLADDER PAIN: ICD-10-CM

## 2023-09-02 DIAGNOSIS — K80.20 CALCULUS OF GALLBLADDER WITHOUT CHOLECYSTITIS WITHOUT OBSTRUCTION: ICD-10-CM

## 2023-09-02 PROCEDURE — 3074F SYST BP LT 130 MM HG: CPT | Performed by: FAMILY MEDICINE

## 2023-09-02 PROCEDURE — 99214 OFFICE O/P EST MOD 30 MIN: CPT | Performed by: FAMILY MEDICINE

## 2023-09-02 PROCEDURE — 3078F DIAST BP <80 MM HG: CPT | Performed by: FAMILY MEDICINE

## 2023-09-02 RX ORDER — KETOROLAC TROMETHAMINE 10 MG/1
TABLET, FILM COATED ORAL
Qty: 20 TABLET | Refills: 0 | Status: SHIPPED | OUTPATIENT
Start: 2023-09-02

## 2023-09-02 ASSESSMENT — FIBROSIS 4 INDEX: FIB4 SCORE: .5773502691896257645

## 2023-09-02 NOTE — PROGRESS NOTES
Chief Complaint:    Chief Complaint   Patient presents with    Gallbladder     Hx of gallstones- due to get removed in November- no medication was given to help with pain or when it flares up    Vomiting    Diarrhea       History of Present Illness:    She presents with concern about symptoms that started this AM - pain in gallbladder, sulfa burps, vomiting, and diarrhea. She took 2 previously prescribed Zofran from when she was pregnant, which helped the nausea today. She still has a lot of Zofran to use as needed. She reports the symptoms that started today are the exact same symptoms when she went to [facility] on 5/21/23 and was told her symptoms were due to gallbladder/gallstones. She reports at that ER visit, she was given Toradol for the pain which helped a lot and Pepto for the diarrhea. At that ER visit, she was also prescribed Cetirizine (she was also having some nasal symptoms) and prescribed Diclofenac x 10 days, but apparently she only took Diclofenac for 6 days, because she found she still has 4 days worth of Diclofenac. After ER visit, she saw Gastroenterology at [facility], then saw a surgeon, and she is scheduled for cholecystectomy in Nov 2023.      Past Medical History:    Past Medical History:   Diagnosis Date    No active medical problems 5/18/2015     Past Surgical History:    Past Surgical History:   Procedure Laterality Date    ORIF, FRACTURE, TIBIA      lt     Social History:    Social History     Socioeconomic History    Marital status:      Spouse name: Not on file    Number of children: Not on file    Years of education: Not on file    Highest education level: Not on file   Occupational History    Not on file   Tobacco Use    Smoking status: Never    Smokeless tobacco: Never   Substance and Sexual Activity    Alcohol use: No    Drug use: No    Sexual activity: Never   Other Topics Concern    Not on file   Social History Narrative    Not on file     Social Determinants of Health  "    Financial Resource Strain: Not on file   Food Insecurity: Not on file   Transportation Needs: Not on file   Physical Activity: Not on file   Stress: Not on file   Social Connections: Not on file   Intimate Partner Violence: Not on file   Housing Stability: Not on file     Family History:    History reviewed. No pertinent family history.    Medications:    Current Outpatient Medications on File Prior to Visit   Medication Sig Dispense Refill    DICLOFENAC PO Take  by mouth. Only given 10 days worth      OMEGA-3 FATTY ACIDS PO Take  by mouth.      vitamin D (VITAMIND D3) 1000 UNIT Tab Take 1,000 Units by mouth every day.      pyridoxine (VITAMIN B-6) 50 MG Tab Take 50 mg by mouth every day.      hydrOXYzine HCl (ATARAX) 10 MG Tab TAKE 1 TO 3 TABLETS BY MOUTH TWICE DAILY AS NEEDED FOR ANXIETY      Prenatal Vit-DSS-Fe Cbn-FA (PRENATAL AD PO) Take  by mouth.      ondansetron (ZOFRAN ODT) 4 MG TBDP Take 1 Tab by mouth every 8 hours as needed for Nausea/Vomiting. 10 Tab 0     No current facility-administered medications on file prior to visit.     Allergies:    Allergies   Allergen Reactions    Morphine      Ineffective for treatment       Vitals:    Vitals:    09/02/23 1004   BP: 110/70   Pulse: 94   Resp: 18   Temp: 36.4 °C (97.5 °F)   TempSrc: Temporal   SpO2: 97%   Weight: 112 kg (248 lb)   Height: 1.778 m (5' 10\")       Physical Exam:    Constitutional: Vital signs reviewed. Appears well-developed and well-nourished. No acute distress.   Eyes: Sclera white, conjunctivae clear.   ENT: External ears normal. Hearing normal.   Neck: Neck supple.   Pulmonary/Chest: Respirations non-labored.   Abdomen: Minimally tender to palpation right upper abdomen. Bowel sounds are normal active. Soft, non-distended, and otherwise non-tender to palpation.  Musculoskeletal: Normal gait. No muscular atrophy or weakness.  Neurological: Alert and oriented to person, place, and time. Muscle tone normal. Coordination normal.   Skin: No " rashes or lesions. Warm, dry, normal turgor.  Psychiatric: Normal mood and affect. Behavior is normal. Judgment and thought content normal.       Assessment / Plan & Medical Decision Makin. Gallbladder pain  - ketorolac (TORADOL) 10 MG Tab; 1 TAB BY MOUTH UP EVERY 6 HOURS ONLY IF NEEDED FOR PAIN. TAKE WITH FOOD.  Dispense: 20 Tablet; Refill: 0    2. Calculus of gallbladder without cholecystitis without obstruction      Discussed with her DDX, management options, and risks, benefits, and alternatives to treatment plan agreed upon.    She presents with concern about symptoms that started this AM - pain in gallbladder, sulfa burps, vomiting, and diarrhea. She took 2 previously prescribed Zofran from when she was pregnant, which helped the nausea today. She still has a lot of Zofran to use as needed. She reports the symptoms that started today are the exact same symptoms when she went to [place] ER on 23 and was told her symptoms were due to gallbladder/gallstones. She reports at that ER visit, she was given Toradol for the pain which helped a lot and Pepto for the diarrhea. At that ER visit, she was also prescribed Cetirizine (she was also having some nasal symptoms) and prescribed Diclofenac x 10 days, but apparently she only took Diclofenac for 6 days, because she found she still has 4 days worth of Diclofenac. After ER visit, she saw Gastroenterology at [facility], then saw a surgeon, and she is scheduled for cholecystectomy in 2023.    Minimally tender to palpation right upper abdomen.    Gallstones - chronic condition with acute exacerbation of pain, vomiting, and diarrhea today.    She does not have an acute abdomen on exam in clinic.    OK to treat symptoms with medications as needed for now. She declines Toradol IM, but willing to get Rx for Toradol pills to use as needed. This was prescribed and she was advised to not take Diclofenac with Toradol.    She still has a lot of previously  prescribed Zofran to use as needed for nausea or vomiting.    She declines Rx medication for diarrhea, prefers to use OTC Pepto as needed for diarrhea - warned of potential for dark stools.    Advised if she has persistently intense abdominal pain, she should go to Emergency Room.    She will return to urgent care if needed.

## 2024-02-06 ENCOUNTER — NON-PROVIDER VISIT (OUTPATIENT)
Dept: URGENT CARE | Facility: PHYSICIAN GROUP | Age: 29
End: 2024-02-06

## 2024-02-06 DIAGNOSIS — Z11.1 PPD SCREENING TEST: ICD-10-CM

## 2024-02-06 PROCEDURE — 86580 TB INTRADERMAL TEST: CPT | Performed by: NURSE PRACTITIONER

## 2024-02-07 NOTE — PROGRESS NOTES
Vanda VillanuevaDruCammie is a 28 y.o. female here for a non-provider visit for PPD placement -- Step 1 of 2    Reason for PPD:  work requirement    1. TB evaluation questionnaire completed by patient? Yes      -  If any answers marked yes did you contact a provider prior to placing? Not Indicated  2.  Patient notified to return to clinic for reading on: 2/8/24 after 354 pm - 2/09/24 before 354Pm  3.  PPD Placement documentation completed on TB evaluation questionnaire? Yes  4.  Location of TB evaluation questionnaire filed: [facility] folder

## 2024-02-08 ENCOUNTER — NON-PROVIDER VISIT (OUTPATIENT)
Dept: URGENT CARE | Facility: PHYSICIAN GROUP | Age: 29
End: 2024-02-08

## 2024-02-08 LAB — TB WHEAL 3D P 5 TU DIAM: NORMAL MM

## 2024-02-09 NOTE — PROGRESS NOTES
Vanda Ghulam is a 28 y.o. female here for a non-provider visit for PPD reading -- Step 1 of 2.      1.  Resulted in Epic under enter/edit results? Yes   2.  TB evaluation questionnaire scanned into chart and original given to patient?Yes      3. Was induration greater than 0 mm? No.    If Step 1 of 2, when is patient returning for second step (delete if N/A): 2/15/24    Routed to PCP? No

## 2024-03-20 ENCOUNTER — OFFICE VISIT (OUTPATIENT)
Dept: URGENT CARE | Facility: PHYSICIAN GROUP | Age: 29
End: 2024-03-20
Payer: COMMERCIAL

## 2024-03-20 VITALS
OXYGEN SATURATION: 99 % | TEMPERATURE: 97.6 F | SYSTOLIC BLOOD PRESSURE: 110 MMHG | HEART RATE: 110 BPM | WEIGHT: 256 LBS | BODY MASS INDEX: 36.73 KG/M2 | RESPIRATION RATE: 16 BRPM | DIASTOLIC BLOOD PRESSURE: 60 MMHG

## 2024-03-20 DIAGNOSIS — J01.90 ACUTE NON-RECURRENT SINUSITIS, UNSPECIFIED LOCATION: ICD-10-CM

## 2024-03-20 PROCEDURE — 3078F DIAST BP <80 MM HG: CPT | Performed by: PHYSICIAN ASSISTANT

## 2024-03-20 PROCEDURE — 99213 OFFICE O/P EST LOW 20 MIN: CPT | Performed by: PHYSICIAN ASSISTANT

## 2024-03-20 PROCEDURE — 3074F SYST BP LT 130 MM HG: CPT | Performed by: PHYSICIAN ASSISTANT

## 2024-03-20 RX ORDER — ESCITALOPRAM OXALATE 10 MG/1
10 TABLET ORAL
COMMUNITY

## 2024-03-20 RX ORDER — AMOXICILLIN AND CLAVULANATE POTASSIUM 875; 125 MG/1; MG/1
1 TABLET, FILM COATED ORAL 2 TIMES DAILY
Qty: 14 TABLET | Refills: 0 | Status: SHIPPED | OUTPATIENT
Start: 2024-03-20 | End: 2024-03-27

## 2024-03-20 RX ORDER — ASPIRIN 81 MG/1
81 TABLET ORAL DAILY
COMMUNITY

## 2024-03-20 RX ORDER — PRENATAL WITH FERROUS FUM AND FOLIC ACID 3080; 920; 120; 400; 22; 1.84; 3; 20; 10; 1; 12; 200; 27; 25; 2 [IU]/1; [IU]/1; MG/1; [IU]/1; MG/1; MG/1; MG/1; MG/1; MG/1; MG/1; UG/1; MG/1; MG/1; MG/1; MG/1
1 TABLET ORAL DAILY
COMMUNITY

## 2024-03-20 ASSESSMENT — ENCOUNTER SYMPTOMS
SINUS PAIN: 1
SHORTNESS OF BREATH: 1
VOMITING: 1
COUGH: 1
SINUS PRESSURE: 1
EYE REDNESS: 0
HEADACHES: 1
MYALGIAS: 1
FEVER: 1
EYE DISCHARGE: 0
DIARRHEA: 0
SORE THROAT: 1

## 2024-03-20 ASSESSMENT — FIBROSIS 4 INDEX: FIB4 SCORE: .5773502691896257645

## 2024-03-20 NOTE — PROGRESS NOTES
Subjective     Vanda Parmar is a 28 y.o. female who presents with Sinus Pain (8-9 days of sinus pain. Is pregnant, been trying sudafed, nasal saline/ netti pot, etc. Sever sinus pressure, face and ears. )            Sinusitis  This is a new problem. Episode onset: x 9-10 days ago. The problem is unchanged. The pain is moderate. Associated symptoms include congestion, coughing, ear pain, headaches, shortness of breath, sinus pressure and a sore throat. Past treatments include oral decongestants, saline nose sprays and acetaminophen (and OTC Delsym).     The patient is currently 20 weeks pregnant.  The patient states she called her OB regarding her ongoing symptoms, and states her OB recommended she be prescribed an antibiotic for likely sinus infection.    PMH:  has a past medical history of No active medical problems (5/18/2015).  MEDS:   Current Outpatient Medications:     escitalopram (LEXAPRO) 10 MG Tab, Take 10 mg by mouth at bedtime., Disp: , Rfl:     Prenatal 27-1 MG Tab, Take 1 Tablet by mouth every day., Disp: , Rfl:     aspirin 81 MG EC tablet, Take 81 mg by mouth every day., Disp: , Rfl:     OMEGA-3 FATTY ACIDS PO, Take  by mouth., Disp: , Rfl:     vitamin D (VITAMIND D3) 1000 UNIT Tab, Take 1,000 Units by mouth every day., Disp: , Rfl:     pyridoxine (VITAMIN B-6) 50 MG Tab, Take 50 mg by mouth every day., Disp: , Rfl:     ondansetron (ZOFRAN ODT) 4 MG TBDP, Take 1 Tab by mouth every 8 hours as needed for Nausea/Vomiting., Disp: 10 Tab, Rfl: 0    DICLOFENAC PO, Take  by mouth. Only given 10 days worth (Patient not taking: Reported on 3/20/2024), Disp: , Rfl:     ketorolac (TORADOL) 10 MG Tab, 1 TAB BY MOUTH UP EVERY 6 HOURS ONLY IF NEEDED FOR PAIN. TAKE WITH FOOD. (Patient not taking: Reported on 3/20/2024), Disp: 20 Tablet, Rfl: 0    hydrOXYzine HCl (ATARAX) 10 MG Tab, TAKE 1 TO 3 TABLETS BY MOUTH TWICE DAILY AS NEEDED FOR ANXIETY (Patient not taking: Reported on 3/20/2024), Disp: , Rfl:      Prenatal Vit-DSS-Fe Cbn-FA (PRENATAL AD PO), Take  by mouth., Disp: , Rfl:   ALLERGIES:   Allergies   Allergen Reactions    Morphine      Ineffective for treatment     SURGHX:   Past Surgical History:   Procedure Laterality Date    ORIF, FRACTURE, TIBIA      lt     SOCHX:  reports that she has never smoked. She has never used smokeless tobacco. She reports that she does not drink alcohol and does not use drugs.  FH: Family history was reviewed, no pertinent findings to report      Review of Systems   Constitutional:  Positive for fever (The patient reports an associatd fever at the onset of symptoms, now resolved.).   HENT:  Positive for congestion, ear pain, sinus pressure, sinus pain and sore throat.    Eyes:  Negative for discharge and redness.   Respiratory:  Positive for cough and shortness of breath.    Gastrointestinal:  Positive for vomiting. Negative for diarrhea.   Musculoskeletal:  Positive for myalgias.   Neurological:  Positive for headaches.              Objective     /60   Pulse (!) 110   Temp 36.4 °C (97.6 °F) (Temporal)   Resp 16   Wt 116 kg (256 lb)   SpO2 99%   BMI 36.73 kg/m²      Physical Exam  Constitutional:       General: She is not in acute distress.     Appearance: Normal appearance. She is not ill-appearing.   HENT:      Head: Normocephalic and atraumatic.      Right Ear: Tympanic membrane, ear canal and external ear normal.      Left Ear: Tympanic membrane, ear canal and external ear normal.      Nose: Nose normal.      Mouth/Throat:      Mouth: Mucous membranes are moist.      Pharynx: Oropharynx is clear. No posterior oropharyngeal erythema.   Eyes:      Extraocular Movements: Extraocular movements intact.      Conjunctiva/sclera: Conjunctivae normal.   Cardiovascular:      Rate and Rhythm: Regular rhythm. Tachycardia present.      Heart sounds: Normal heart sounds.   Pulmonary:      Effort: Pulmonary effort is normal. No respiratory distress.      Breath sounds: Normal  breath sounds. No wheezing.   Musculoskeletal:         General: Normal range of motion.      Cervical back: Normal range of motion and neck supple.   Skin:     General: Skin is warm and dry.   Neurological:      Mental Status: She is alert and oriented to person, place, and time.                             Assessment & Plan        1. Acute non-recurrent sinusitis, unspecified location  - amoxicillin-clavulanate (AUGMENTIN) 875-125 MG Tab; Take 1 Tablet by mouth 2 times a day for 7 days.  Dispense: 14 Tablet; Refill: 0    Differential diagnoses, supportive care, and indications for immediate follow-up discussed with patient.   Instructed to return to clinic or nearest emergency department for any change in condition, further concerns, or worsening of symptoms.    OTC Tylenol for fever/discomfort.  OTC cough/cold medication for symptomatic relief  OTC antihistamines for symptomatic relief  OTC Supportive Care for Congestion - saline nasal spray or neti pot  Drink plenty of fluids  Follow-up with PCP  Return to clinic or go to the ED if symptoms worsen or fail to improve, or if the patient should develop worsening/increasing sinus pain/pressure, congestion, ear pain, sore throat, headache, cough, shortness of breath, wheezing, chest pain, fever/chills, and/or any concerning symptoms.    Discussed plan with the patient, and she agrees to the above.     I personally reviewed prior external notes and test results pertinent to today's visit.  I have independently reviewed and interpreted all diagnostics ordered during this urgent care visit.     Please note that this dictation was created using voice recognition software. I have made every reasonable attempt to correct obvious errors, but I expect that there may be errors of grammar and possibly content that I did not discover before finalizing the note.     This note was electronically signed by Jing Winn PA-C

## 2024-05-04 ENCOUNTER — OFFICE VISIT (OUTPATIENT)
Dept: URGENT CARE | Facility: PHYSICIAN GROUP | Age: 29
End: 2024-05-04
Payer: COMMERCIAL

## 2024-05-04 VITALS
RESPIRATION RATE: 16 BRPM | SYSTOLIC BLOOD PRESSURE: 108 MMHG | BODY MASS INDEX: 36.08 KG/M2 | OXYGEN SATURATION: 97 % | HEART RATE: 115 BPM | HEIGHT: 70 IN | WEIGHT: 252 LBS | TEMPERATURE: 97 F | DIASTOLIC BLOOD PRESSURE: 78 MMHG

## 2024-05-04 DIAGNOSIS — B96.89 BACTERIAL LOWER RESPIRATORY INFECTION: ICD-10-CM

## 2024-05-04 DIAGNOSIS — J22 BACTERIAL LOWER RESPIRATORY INFECTION: ICD-10-CM

## 2024-05-04 DIAGNOSIS — J20.9 ACUTE BRONCHITIS, UNSPECIFIED ORGANISM: ICD-10-CM

## 2024-05-04 PROCEDURE — 99213 OFFICE O/P EST LOW 20 MIN: CPT | Performed by: FAMILY MEDICINE

## 2024-05-04 PROCEDURE — 3078F DIAST BP <80 MM HG: CPT | Performed by: FAMILY MEDICINE

## 2024-05-04 PROCEDURE — 3074F SYST BP LT 130 MM HG: CPT | Performed by: FAMILY MEDICINE

## 2024-05-04 RX ORDER — AMOXICILLIN AND CLAVULANATE POTASSIUM 875; 125 MG/1; MG/1
TABLET, FILM COATED ORAL
Qty: 14 TABLET | Refills: 0 | Status: SHIPPED | OUTPATIENT
Start: 2024-05-04 | End: 2024-05-11

## 2024-05-04 RX ORDER — ALBUTEROL SULFATE 90 UG/1
AEROSOL, METERED RESPIRATORY (INHALATION)
Qty: 8.5 G | Refills: 0 | Status: SHIPPED | OUTPATIENT
Start: 2024-05-04

## 2024-05-04 ASSESSMENT — FIBROSIS 4 INDEX: FIB4 SCORE: .5773502691896257645

## 2024-05-04 NOTE — PROGRESS NOTES
Chief Complaint:    Chief Complaint   Patient presents with    Cough     X10 days Cough, congestion, dark green mucus, wheezing, sob, sinus pressure        History of Present Illness:    Symptoms for at least 7 days, getting worse. Feels symptoms getting into lungs. Has cough productive of a lot of dark, purulent mucus, wheezing, shortness of breath, and post-nasal drainage. She is 27 weeks pregnant. Augmentin has worked and been tolerated in the past.      Past Medical History:    Past Medical History:   Diagnosis Date    No active medical problems 5/18/2015     Past Surgical History:    Past Surgical History:   Procedure Laterality Date    ORIF, FRACTURE, TIBIA      lt     Social History:    Social History     Socioeconomic History    Marital status:      Spouse name: Not on file    Number of children: Not on file    Years of education: Not on file    Highest education level: Not on file   Occupational History    Not on file   Tobacco Use    Smoking status: Never    Smokeless tobacco: Never   Substance and Sexual Activity    Alcohol use: No    Drug use: No    Sexual activity: Never   Other Topics Concern    Not on file   Social History Narrative    Not on file     Social Determinants of Health     Financial Resource Strain: Not on file   Food Insecurity: Not on file   Transportation Needs: Not on file   Physical Activity: Not on file   Stress: Not on file   Social Connections: Not on file   Intimate Partner Violence: Not on file   Housing Stability: Not on file     Family History:    History reviewed. No pertinent family history.    Medications:    No current outpatient medications on file prior to visit.     No current facility-administered medications on file prior to visit.     Allergies:    Allergies   Allergen Reactions    Morphine      Ineffective for treatment       Vitals:    Vitals:    05/04/24 1229   BP: 108/78   Pulse: (!) 115   Resp: 16   Temp: 36.1 °C (97 °F)   TempSrc: Temporal   SpO2: 97%  "  Weight: 114 kg (252 lb)   Height: 1.778 m (5' 10\")       Physical Exam:    Constitutional: Vital signs reviewed. Appears well-developed and well-nourished. Occl cough. No acute distress.   Eyes: Sclera white, conjunctivae clear.   ENT: External ears normal. External auditory canals normal without discharge. TMs translucent and non-bulging. Hearing normal. Lips/teeth are normal. Oral mucosa pink and moist. Posterior pharynx: WNL.  Neck: Neck supple.   Cardiovascular: Regular rate and rhythm. No murmur.  Pulmonary/Chest: Respirations non-labored. Clear to auscultation bilaterally.  Musculoskeletal: Normal gait. No muscular atrophy or weakness.  Neurological: Alert and oriented to person, place, and time. Muscle tone normal. Coordination normal.   Skin: No rashes or lesions. Warm, dry, normal turgor.  Psychiatric: Normal mood and affect. Behavior is normal. Judgment and thought content normal.       Assessment / Plan & Medical Decision Makin. Bacterial lower respiratory infection  - amoxicillin-clavulanate (AUGMENTIN) 875-125 MG Tab; 1 TAB BY MOUTH TWICE A DAY X 7 DAYS. TAKE WITH FOOD.  Dispense: 14 Tablet; Refill: 0    2. Acute bronchitis, unspecified organism  - albuterol 108 (90 Base) MCG/ACT Aero Soln inhalation aerosol; 2 PUFFS EVERY 4 HOURS ONLY IF NEEDED FOR COUGH, WHEEZING, OR SHORTNESS OF BREATH.  Dispense: 8.5 g; Refill: 0       Discussed with her DDX, management options, and risks, benefits, and alternatives to treatment plan agreed upon.    Symptoms for at least 7 days, getting worse. Feels symptoms getting into lungs. Has cough productive of a lot of dark, purulent mucus, wheezing, shortness of breath, and post-nasal drainage. She is 27 weeks pregnant. Augmentin has worked and been tolerated in the past.    Occl cough.     Agreeable to medications prescribed.    Discussed expected course of duration, time for improvement, and to seek follow-up in Emergency Room, urgent care, or with PCP if getting " worse at any time or not improving within expected time frame.

## 2024-05-29 ENCOUNTER — TELEPHONE (OUTPATIENT)
Dept: MATERNAL FETAL MEDICINE | Facility: MEDICAL CENTER | Age: 29
End: 2024-05-29

## 2024-05-29 ENCOUNTER — ANCILLARY PROCEDURE (OUTPATIENT)
Dept: MATERNAL FETAL MEDICINE | Facility: MEDICAL CENTER | Age: 29
End: 2024-05-29
Payer: COMMERCIAL

## 2024-05-29 VITALS
WEIGHT: 253 LBS | BODY MASS INDEX: 36.3 KG/M2 | HEART RATE: 94 BPM | SYSTOLIC BLOOD PRESSURE: 103 MMHG | DIASTOLIC BLOOD PRESSURE: 69 MMHG

## 2024-05-29 DIAGNOSIS — Z3A.30 30 WEEKS GESTATION OF PREGNANCY: ICD-10-CM

## 2024-05-29 DIAGNOSIS — Z82.79 FAMILY HISTORY OF CLEFT LIP AND PALATE: ICD-10-CM

## 2024-05-29 DIAGNOSIS — Z36.2 ENCOUNTER FOR OTHER ANTENATAL SCREENING FOLLOW-UP: ICD-10-CM

## 2024-05-29 DIAGNOSIS — Z14.8 CARRIER OF GENETIC DISORDER: ICD-10-CM

## 2024-05-29 ASSESSMENT — FIBROSIS 4 INDEX: FIB4 SCORE: 0.6

## 2024-05-29 NOTE — TELEPHONE ENCOUNTER
Spoke with patient about her consent form for her carrier screening, asked patient if it was possible for her  to come in and sign his part of the consent form and we can fax it from there, patient was very understanding.     Rodolfo Morel Ass't

## 2025-01-09 ENCOUNTER — OFFICE VISIT (OUTPATIENT)
Dept: URGENT CARE | Facility: PHYSICIAN GROUP | Age: 30
End: 2025-01-09
Payer: COMMERCIAL

## 2025-01-09 VITALS
TEMPERATURE: 97.9 F | SYSTOLIC BLOOD PRESSURE: 120 MMHG | RESPIRATION RATE: 16 BRPM | WEIGHT: 243.9 LBS | HEART RATE: 81 BPM | BODY MASS INDEX: 35 KG/M2 | OXYGEN SATURATION: 99 % | DIASTOLIC BLOOD PRESSURE: 80 MMHG

## 2025-01-09 DIAGNOSIS — R19.7 NAUSEA VOMITING AND DIARRHEA: ICD-10-CM

## 2025-01-09 DIAGNOSIS — R11.2 NAUSEA VOMITING AND DIARRHEA: ICD-10-CM

## 2025-01-09 PROCEDURE — 1125F AMNT PAIN NOTED PAIN PRSNT: CPT | Performed by: NURSE PRACTITIONER

## 2025-01-09 PROCEDURE — 99213 OFFICE O/P EST LOW 20 MIN: CPT | Performed by: NURSE PRACTITIONER

## 2025-01-09 PROCEDURE — 3079F DIAST BP 80-89 MM HG: CPT | Performed by: NURSE PRACTITIONER

## 2025-01-09 PROCEDURE — 3074F SYST BP LT 130 MM HG: CPT | Performed by: NURSE PRACTITIONER

## 2025-01-09 RX ORDER — KETOROLAC TROMETHAMINE 10 MG/1
10 TABLET, FILM COATED ORAL EVERY 4 HOURS PRN
COMMUNITY

## 2025-01-09 RX ORDER — DICYCLOMINE HCL 20 MG
TABLET ORAL
Qty: 20 TABLET | Refills: 0 | Status: SHIPPED | OUTPATIENT
Start: 2025-01-09

## 2025-01-09 RX ORDER — ONDANSETRON 4 MG/1
TABLET, ORALLY DISINTEGRATING ORAL
Qty: 20 TABLET | Refills: 0 | Status: SHIPPED | OUTPATIENT
Start: 2025-01-09

## 2025-01-09 ASSESSMENT — PAIN SCALES - GENERAL: PAINLEVEL_OUTOF10: 9=SEVERE PAIN

## 2025-01-09 NOTE — PROGRESS NOTES
Subjective:   Vanda Parmar is a 29 y.o. female who presents for Vomiting (Is on semeglutide and unsure if causing. Been using otc- not helping. Been staying consistent and hits her in the afternoon with severe pain. All over abdomen. No fevers. ) and Diarrhea (Vomiting and diarrhea since Sunday. Sulfur burping and abdominal pain. No black or bloody stools. )    Patient is a 29-year-old female presenting clinic today reporting 4-day history of nausea, vomiting, diarrhea, and abdominal cramping.  Patient denies any fevers, chills, hematemesis, or blood or mucus in her stools.   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  Patient does take semaglutide and did increase her dose on Saturday just prior to symptoms starting.  She does report getting nausea and vomiting as well as sulfa smelling/tasting burps from the semaglutide.  Previous history of cholecystectomy.    Patient has been taking over-the-counter Pepto and Imodium with minimal symptom improvement.  She states that she has not been tolerating food however is tolerating fluids well and has been able to stay well-hydrated.    Medications, Allergies, and current problem list reviewed today in Epic.     Objective:     /80   Pulse 81   Temp 36.6 °C (97.9 °F) (Temporal)   Resp 16   Wt 111 kg (243 lb 14.4 oz)   SpO2 99%     Physical Exam  Vitals reviewed.   Constitutional:       General: She is not in acute distress.     Appearance: Normal appearance. She is not ill-appearing, toxic-appearing or diaphoretic.   HENT:      Head: Normocephalic.      Nose: Nose normal.      Mouth/Throat:      Mouth: Mucous membranes are moist.   Eyes:      Extraocular Movements: Extraocular movements intact.      Conjunctiva/sclera: Conjunctivae normal.      Pupils: Pupils are equal, round, and reactive to light.   Cardiovascular:      Rate and Rhythm: Normal rate and regular rhythm.   Pulmonary:      Effort: Pulmonary  effort is normal.   Abdominal:      General: Abdomen is flat. Bowel sounds are normal. There is no distension.      Palpations: Abdomen is soft.      Tenderness: There is generalized abdominal tenderness. There is no guarding or rebound. Negative signs include McBurney's sign.   Musculoskeletal:         General: Normal range of motion.      Cervical back: Normal range of motion and neck supple.   Skin:     General: Skin is warm and dry.   Neurological:      Mental Status: She is alert and oriented to person, place, and time.   Psychiatric:         Mood and Affect: Mood normal.         Behavior: Behavior normal.         Thought Content: Thought content normal.         Judgment: Judgment normal.         Assessment/Plan:     Diagnosis and associated orders:     1. Nausea vomiting and diarrhea  ondansetron (ZOFRAN ODT) 4 MG TABLET DISPERSIBLE    dicyclomine (BENTYL) 20 MG Tab         Comments/MDM:     This patient presents with  nausea, vomiting & diarrhea. Differential diagnoses includes possible acute gastroenteritis.  There is a known neuro outbreak within the community.  Possible side effect of semaglutide however doubtful at this time.    Abdominal exam without peritoneal signs. Currently  without evidence of dehydration. No evidence of surgical abdomen or other acute medical emergency including bowel obstruction, appendicitis, or acute cholecystitis at this time. Presentation not consistent with other acute, emergent causes of vomiting / diarrhea at this time. No indication for abdominal imaging.  Vital signs all within normal range    Plan:   Discussed adding a daily probiotic for diarrhea. Zofran  as needed for nausea/vomiting as prescribed above  May use over-the-counter Imodium or Pepto-Bismol to help with diarrhea.    Encourage fluids (avoid sugary drinks) and small meals as tolerated (avoid fatty foods and sugary foods).  Follow up if symptoms persist/worsen, new symptoms develop or any other concerns arise.   ER precautions discussed.    Patient was involved with shared decision-making throughout the exam today and verbalizes understanding regards to plan of care, discharge instructions, and follow-up         Differential diagnosis, natural history, supportive care, and indications for immediate follow-up discussed.    Advised the patient to follow-up with the primary care physician for recheck, reevaluation, and consideration of further management.    I personally reviewed prior external notes and test results pertinent to today's visit as well as additional imaging and testing completed in clinic today.     Please note that this dictation was created using voice recognition software. I have made a reasonable attempt to correct obvious errors, but I expect that there are errors of grammar and possibly content that I did not discover before finalizing the note.

## 2025-07-29 ENCOUNTER — APPOINTMENT (OUTPATIENT)
Dept: URBAN - NONMETROPOLITAN AREA CLINIC 15 | Facility: CLINIC | Age: 30
Setting detail: DERMATOLOGY
End: 2025-07-29

## 2025-07-29 DIAGNOSIS — L72.0 EPIDERMAL CYST: ICD-10-CM

## 2025-07-29 DIAGNOSIS — B07.8 OTHER VIRAL WARTS: ICD-10-CM

## 2025-07-29 PROCEDURE — ? PRESCRIPTION

## 2025-07-29 PROCEDURE — ? MEDICATION COUNSELING

## 2025-07-29 PROCEDURE — ? COUNSELING

## 2025-07-29 PROCEDURE — ? ADDITIONAL NOTES

## 2025-07-29 RX ORDER — FLUOROURACIL 5 MG/G
CREAM TOPICAL QD
Qty: 40 | Refills: 1 | Status: ERX | COMMUNITY
Start: 2025-07-29

## 2025-07-29 RX ADMIN — FLUOROURACIL: 5 CREAM TOPICAL at 00:00

## 2025-07-29 ASSESSMENT — LOCATION ZONE DERM
LOCATION ZONE: FINGER
LOCATION ZONE: FACE

## 2025-07-29 ASSESSMENT — LOCATION SIMPLE DESCRIPTION DERM
LOCATION SIMPLE: RIGHT CHEEK
LOCATION SIMPLE: RIGHT INDEX FINGER

## 2025-07-29 ASSESSMENT — LOCATION DETAILED DESCRIPTION DERM
LOCATION DETAILED: RIGHT INFERIOR CENTRAL MALAR CHEEK
LOCATION DETAILED: RIGHT DISTAL PALMAR INDEX FINGER